# Patient Record
Sex: FEMALE | Race: WHITE | NOT HISPANIC OR LATINO | URBAN - METROPOLITAN AREA
[De-identification: names, ages, dates, MRNs, and addresses within clinical notes are randomized per-mention and may not be internally consistent; named-entity substitution may affect disease eponyms.]

---

## 2023-03-22 ENCOUNTER — OFFICE VISIT (OUTPATIENT)
Dept: URBAN - METROPOLITAN AREA CLINIC 78 | Facility: CLINIC | Age: 48
End: 2023-03-22
Payer: COMMERCIAL

## 2023-03-22 ENCOUNTER — DASHBOARD ENCOUNTERS (OUTPATIENT)
Age: 48
End: 2023-03-22

## 2023-03-22 VITALS
DIASTOLIC BLOOD PRESSURE: 67 MMHG | HEART RATE: 81 BPM | SYSTOLIC BLOOD PRESSURE: 95 MMHG | TEMPERATURE: 98.1 F | WEIGHT: 124 LBS | RESPIRATION RATE: 16 BRPM | HEIGHT: 65 IN | BODY MASS INDEX: 20.66 KG/M2

## 2023-03-22 DIAGNOSIS — K62.5 RECTAL BLEEDING: ICD-10-CM

## 2023-03-22 DIAGNOSIS — Z12.11 COLON CANCER SCREENING: ICD-10-CM

## 2023-03-22 PROCEDURE — 99243 OFF/OP CNSLTJ NEW/EST LOW 30: CPT | Performed by: INTERNAL MEDICINE

## 2023-03-22 PROCEDURE — 99203 OFFICE O/P NEW LOW 30 MIN: CPT | Performed by: INTERNAL MEDICINE

## 2023-03-22 RX ORDER — SODIUM SULFATE, MAGNESIUM SULFATE, AND POTASSIUM CHLORIDE 17.75; 2.7; 2.25 G/1; G/1; G/1
12 TABLETS TABLET ORAL
Qty: 24 TABLETS | OUTPATIENT

## 2023-03-22 RX ORDER — HYDROXYCHLOROQUINE SULFATE 200 MG/1
AS DIRECTED TABLET, FILM COATED ORAL
Status: ACTIVE | COMMUNITY

## 2023-03-22 RX ORDER — METHOTREXATE SODIUM 2.5 MG/1
AS DIRECTED TABLET ORAL
Status: ACTIVE | COMMUNITY

## 2023-03-22 RX ORDER — METHIMAZOLE 5 MG/1
1 TABLET TABLET ORAL ONCE A DAY
Status: ACTIVE | COMMUNITY

## 2023-03-22 NOTE — HPI-TODAY'S VISIT:
Patient was referred by Lyla Chavis NP A copy of this document will be sent to the physician.  The patient presents for a colon cancer screening. There is no family history of colon polyps or cancer. Patient denies change in bowel habits, appetite, and weight.  Last colonoscopy: 10 years ago    Patient episode of bright red blood per rectum it was on wiping Baseline BM are described as regular and loose every so often  Recent Diagnosis of is a rheumatoid arthritis  currently on Hydrochloroquine Personal history of Graves and Sjogrens  Daughter has Celiac disease  Patient has screened negative    Deneis chest pain  Deneis SOB  Denies easy brusing  Denies anesthesia complication   Cardiac risk : Denies any pending cardiac work up

## 2023-04-24 ENCOUNTER — WEB ENCOUNTER (OUTPATIENT)
Dept: URBAN - METROPOLITAN AREA CLINIC 78 | Facility: CLINIC | Age: 48
End: 2023-04-24

## 2023-04-24 RX ORDER — SODIUM PICOSULFATE, MAGNESIUM OXIDE, AND ANHYDROUS CITRIC ACID 12; 3.5; 1 G/175ML; G/175ML; MG/175ML
175ML DOSE 1 175ML DOSE 2 LIQUID ORAL
Qty: 350 MILLILITER | Refills: 0 | OUTPATIENT
Start: 2023-04-25 | End: 2023-04-27

## 2023-04-27 ENCOUNTER — OFFICE VISIT (OUTPATIENT)
Dept: URBAN - METROPOLITAN AREA SURGERY CENTER 15 | Facility: SURGERY CENTER | Age: 48
End: 2023-04-27

## 2023-05-30 ENCOUNTER — OFFICE VISIT (OUTPATIENT)
Dept: URBAN - METROPOLITAN AREA SURGERY CENTER 15 | Facility: SURGERY CENTER | Age: 48
End: 2023-05-30
Payer: COMMERCIAL

## 2023-05-30 DIAGNOSIS — Z12.11 COLON CANCER SCREENING: ICD-10-CM

## 2023-05-30 PROCEDURE — G8907 PT DOC NO EVENTS ON DISCHARG: HCPCS | Performed by: INTERNAL MEDICINE

## 2023-05-30 PROCEDURE — 45378 DIAGNOSTIC COLONOSCOPY: CPT | Performed by: INTERNAL MEDICINE
